# Patient Record
Sex: MALE | NOT HISPANIC OR LATINO | Employment: PART TIME | URBAN - METROPOLITAN AREA
[De-identification: names, ages, dates, MRNs, and addresses within clinical notes are randomized per-mention and may not be internally consistent; named-entity substitution may affect disease eponyms.]

---

## 2017-02-10 ENCOUNTER — HOSPITAL ENCOUNTER (OUTPATIENT)
Dept: SPEECH THERAPY | Facility: HOSPITAL | Age: 27
Setting detail: THERAPIES SERIES
Discharge: HOME | End: 2017-02-10

## 2017-02-10 PROCEDURE — G8999 MOTOR SPEECH CURRENT STATUS: HCPCS

## 2017-02-10 PROCEDURE — G9186 MOTOR SPEECH GOAL STATUS: HCPCS

## 2017-02-10 PROCEDURE — G9158 MOTOR SPEECH D/C STATUS: HCPCS

## 2017-02-10 PROCEDURE — 92523 SPEECH SOUND LANG COMPREHEN: CPT

## 2017-02-10 NOTE — THERAPY DISCHARGE NOTE
"Outpatient Speech Language Pathology   Adult Speech Language Cognitive Eval/Discharge  [facility]     Patient Name: Scar Barraza  : 1990  MRN: [id number]  Today's Date: 2/10/2017         Visit Date: 02/10/2017    There is no problem list on file for this patient.       No past medical history on file.     No past surgical history on file.      Visit Dx:  No diagnosis found.              Adult Speech Language - 02/10/17 1400     Background and History    Reason for Referral Pt is a 26 year old male who was referred for skilled SLP services with a hx of ADHD, ODD and PDD with mild moderate mental retardation.  Pt states that his speech problem is genetic, \"My mom and dad talked this way.\"  -HG    Stated Goals Pt does not wish to receive skilled services.  -HG    Description of Complaint Per MD, hyponasality with articulation errors.  -HG    Previous Functional Status Speech was fluent   With sound errors present.  -HG    Pertinent Medications Not on file.  -HG    Primary Language in the Home English  -HG    Primary Caregiver Legal Guardian  -HG    Informant for the Evaluation Legal Guardian  -HG    Comprehension    Recognition WFL: Within Functional Limits  -    Answer Questions WFL except;complex yes/no questions;abstract yes/no;questions about a paragraph;questions about multiple paragraphs  -    Abstract Yes/No Questions --   Could not answer  -    Questions About a Paragraph Did not assess  -    Questions About Multiple Paragraphs Did not assess  -    Commands WFL: Within Functional Limits;one step basic commands  -    Conversation WFL except;complex conversation  -HG    Complex Conversation Moderate  -HG    Interfering Components Attention - sustained;Motor planning;Processing speed  -HG    Expression    Primary Mode of Expression verbal  -HG    Expressive Language WFL  -HG    Receptive Language WFL  -HG    Automatic Speech WFL  -HG    Repetition WFL  -HG    Oral Motor    Facial Appearance " WFL  -HG    Dentition poor dental/oral hygiene  -HG    Secretions manages secretions (comment)  -HG    Lips WFL  -HG    Tongue WFL  -HG    Palate WFL  -HG    Cheeks WFL  -HG    Jaw could not assess  -HG    Oral Motor Planning    Imitating Isolated Oral Movements accurate  -HG    Producing Isolated Oral Movement on Command accurate, immediate, on command  -HG    Imitating Sequental Oral Movements accurate  -HG    Producing Sequential Oral Motor Movements on Command accurate, immediate, on command  -HG    Resonance    Resonance hyponasal  -HG    Resonance Comments Pt refused to participate in a layrngeal function study  -HG    Reflexes    Gag Reflex present  -HG    Swallow Reflex present  -HG    Dysarthria- Informal Assessment    Respiratory Support Effect on Speech WFL  -HG    Phonation Effects on Speech monopitch  -HG    Tasks Used to Assess Phonation other (comment)   pt would not participate for a function analysis.  -HG    Articulation Effects on Speech imprecise consonants;weak pressure consonants;distorted vowels   /r/, /l/, /s/, possible vowels  -HG    Imprecise Consonants Moderate severe  -HG    Weak Pressure Consonants Mild moderate  -HG    Distorted Vowels Mild  -HG    Tasks Used to Assess Articulation vowel prolongation;connected speech  -HG    Articulation Comments At the conversation level, pt's speech is intellgible however, defined articulation errors are present.  -HG    Resonance Effects on Speech did not assess;hyponasal   suspect  -HG    Hyponasal Did not assess   Pt refused, objectively, yes  -HG    Tasks Used to Assess Resonance vowel prolongation;connected speech  -HG    Intelligibility of Acoustic Signal    Easily Understood By: family/caregiver  -HG    Comprehensibility of Message    Message is Usually Comprehensible To: family/caregiver  -HG    Uses Strategies to Improve Comprehensibility when cued  -HG    When Strategies are Used, Message is Comprehensible To: family/caregiver  -HG     Efficiency of Verbal Communication    Verbal Messages are: take longer than usual  -    Environmental Factors that Improve Comprehensibility other (comment)   over articulation, slow rate of speech.  -    Type of Dysarthria    Type of Dysarthria Flaccid Dysarthria  -      User Key  (r) = Recorded By, (t) = Taken By, (c) = Cosigned By    Initials Name Provider Type     Yuliya PETERSEN MS [facility]-SLP Speech and Language Pathologist                              OP SLP Education       02/10/17 1400          Education    Barriers to Learning Resistent to information  -      Education Provided Described results of evaluation;Patient expressed understanding of evaluation;Family/caregivers expressed understanding of evaluation;Patient demonstrated recommended strategies;Patient requires further education on strategies, risks;Family/caregivers require further education on strategies, risks  -      Assessed Learning needs;Learning motivation;Learning preferences;Learning readiness  -      Learning Motivation Weak  -      Learning Method Explanation;Demonstration  -      Teaching Response Verbalized understanding;Demonstrated understanding;Reinforcement needed;Offered/refused  -HG      Education Comments SLP explained that for therapy to be successful, pt will need to actively participate in exercises and homework outside of the therapy room and at this time, he does not wish to change his speech patterns and does not want to participate in skilled therapy.  -        User Key  (r) = Recorded By, (t) = Taken By, (c) = Cosigned By    Initials Name Effective Dates     Yuliya TRINITY Pritchett MS [facility]-SLP 06/22/15 -                     OP SLP Assessment/Plan - 02/10/17 1400     SLP Assessment    Functional Problems Speech Language- Adult/Cognition  -    Impact on Function: Adult Speech Language/Cognition Restrictions in personal and social life;Poor attention to task;Requires supervision  -    Clinical Impression:  Speech Language-Adult/Congnition Moderate:;Dysarthria- Flaccid   with obvious articulation errors.  -HG    Functional Problems Comment Pt states that he has always spoke this way and that he has received skilled SLP services when he was younger.  -HG    Clinical Impression Comments Pt has definite articulation errors with dysarthria, overall weak tone.  -HG    Please refer to paper survey for additional self-reported information Yes  -HG    Please refer to items scanned into chart for additional diagnostic informaiton and handouts as provided by clinician Yes  -HG    SLP Diagnosis Moderate flaccid dysarthria  -HG    Prognosis Fair (comment)  -HG    Patient/caregiver participated in establishment of treatment plan and goals Yes  -HG    Patient would benefit from skilled therapy intervention Yes  -HG    SLP Plan    Planned CPT's? SLP SPEECH & LANGUAGE EVAL: 53738  -    Plan Comments Pt does not wish to receive skilled services at this time.  -      User Key  (r) = Recorded By, (t) = Taken By, (c) = Cosigned By    Initials Name Provider Type     Yuliya Pritchett MS CCC-SLP Speech and Language Pathologist                 Time Calculation:   SLP Start Time: 1400    Therapy Charges for Today     Code Description Service Date Service Provider Modifiers Qty    [id number]  ST EVAL SPEECH AND PROD W LANG  5 2/10/2017 Yuliya Pritchett MS CCC-SLP GN 1                      Yuliya Pritchett MS CCC-SLP  2/10/2017